# Patient Record
Sex: MALE | Race: WHITE | Employment: FULL TIME | URBAN - METROPOLITAN AREA
[De-identification: names, ages, dates, MRNs, and addresses within clinical notes are randomized per-mention and may not be internally consistent; named-entity substitution may affect disease eponyms.]

---

## 2018-01-06 ENCOUNTER — APPOINTMENT (OUTPATIENT)
Dept: CT IMAGING | Age: 33
DRG: 158 | End: 2018-01-06
Attending: EMERGENCY MEDICINE
Payer: SELF-PAY

## 2018-01-06 ENCOUNTER — HOSPITAL ENCOUNTER (INPATIENT)
Age: 33
LOS: 2 days | Discharge: HOME | DRG: 158 | End: 2018-01-08
Attending: EMERGENCY MEDICINE | Admitting: INTERNAL MEDICINE
Payer: SELF-PAY

## 2018-01-06 DIAGNOSIS — K04.7 DENTAL ABSCESS: ICD-10-CM

## 2018-01-06 DIAGNOSIS — L03.221 CELLULITIS, NECK: Primary | ICD-10-CM

## 2018-01-06 PROBLEM — L03.90 CELLULITIS: Status: ACTIVE | Noted: 2018-01-06

## 2018-01-06 LAB
ALBUMIN SERPL-MCNC: 3.6 G/DL (ref 3.5–5)
ALBUMIN/GLOB SERPL: 0.9 {RATIO} (ref 1.1–2.2)
ALP SERPL-CCNC: 65 U/L (ref 45–117)
ALT SERPL-CCNC: 31 U/L (ref 12–78)
ANION GAP SERPL CALC-SCNC: 8 MMOL/L (ref 5–15)
AST SERPL-CCNC: 11 U/L (ref 15–37)
BASOPHILS # BLD: 0 K/UL (ref 0–0.1)
BASOPHILS NFR BLD: 0 % (ref 0–1)
BILIRUB SERPL-MCNC: 0.9 MG/DL (ref 0.2–1)
BUN SERPL-MCNC: 11 MG/DL (ref 6–20)
BUN/CREAT SERPL: 13 (ref 12–20)
CALCIUM SERPL-MCNC: 8.7 MG/DL (ref 8.5–10.1)
CHLORIDE SERPL-SCNC: 103 MMOL/L (ref 97–108)
CO2 SERPL-SCNC: 29 MMOL/L (ref 21–32)
CREAT SERPL-MCNC: 0.85 MG/DL (ref 0.7–1.3)
EOSINOPHIL # BLD: 0 K/UL (ref 0–0.4)
EOSINOPHIL NFR BLD: 0 % (ref 0–7)
ERYTHROCYTE [DISTWIDTH] IN BLOOD BY AUTOMATED COUNT: 11.9 % (ref 11.5–14.5)
GLOBULIN SER CALC-MCNC: 3.8 G/DL (ref 2–4)
GLUCOSE SERPL-MCNC: 111 MG/DL (ref 65–100)
HCT VFR BLD AUTO: 43.1 % (ref 36.6–50.3)
HGB BLD-MCNC: 14.8 G/DL (ref 12.1–17)
LYMPHOCYTES # BLD: 2.3 K/UL (ref 0.8–3.5)
LYMPHOCYTES NFR BLD: 17 % (ref 12–49)
MCH RBC QN AUTO: 31.1 PG (ref 26–34)
MCHC RBC AUTO-ENTMCNC: 34.3 G/DL (ref 30–36.5)
MCV RBC AUTO: 90.5 FL (ref 80–99)
MONOCYTES # BLD: 1.6 K/UL (ref 0–1)
MONOCYTES NFR BLD: 12 % (ref 5–13)
NEUTS SEG # BLD: 9.6 K/UL (ref 1.8–8)
NEUTS SEG NFR BLD: 71 % (ref 32–75)
PLATELET # BLD AUTO: 184 K/UL (ref 150–400)
POTASSIUM SERPL-SCNC: 3.6 MMOL/L (ref 3.5–5.1)
PROT SERPL-MCNC: 7.4 G/DL (ref 6.4–8.2)
RBC # BLD AUTO: 4.76 M/UL (ref 4.1–5.7)
SODIUM SERPL-SCNC: 140 MMOL/L (ref 136–145)
WBC # BLD AUTO: 13.6 K/UL (ref 4.1–11.1)

## 2018-01-06 PROCEDURE — 70491 CT SOFT TISSUE NECK W/DYE: CPT

## 2018-01-06 PROCEDURE — 80053 COMPREHEN METABOLIC PANEL: CPT | Performed by: EMERGENCY MEDICINE

## 2018-01-06 PROCEDURE — 74011250636 HC RX REV CODE- 250/636: Performed by: EMERGENCY MEDICINE

## 2018-01-06 PROCEDURE — 77030027138 HC INCENT SPIROMETER -A

## 2018-01-06 PROCEDURE — 99284 EMERGENCY DEPT VISIT MOD MDM: CPT

## 2018-01-06 PROCEDURE — 74011000258 HC RX REV CODE- 258: Performed by: INTERNAL MEDICINE

## 2018-01-06 PROCEDURE — 84484 ASSAY OF TROPONIN QUANT: CPT | Performed by: INTERNAL MEDICINE

## 2018-01-06 PROCEDURE — 96375 TX/PRO/DX INJ NEW DRUG ADDON: CPT

## 2018-01-06 PROCEDURE — 96374 THER/PROPH/DIAG INJ IV PUSH: CPT

## 2018-01-06 PROCEDURE — 74011250636 HC RX REV CODE- 250/636: Performed by: INTERNAL MEDICINE

## 2018-01-06 PROCEDURE — 85025 COMPLETE CBC W/AUTO DIFF WBC: CPT | Performed by: EMERGENCY MEDICINE

## 2018-01-06 PROCEDURE — 96361 HYDRATE IV INFUSION ADD-ON: CPT

## 2018-01-06 PROCEDURE — 93005 ELECTROCARDIOGRAM TRACING: CPT

## 2018-01-06 PROCEDURE — 87040 BLOOD CULTURE FOR BACTERIA: CPT | Performed by: EMERGENCY MEDICINE

## 2018-01-06 PROCEDURE — 65660000000 HC RM CCU STEPDOWN

## 2018-01-06 PROCEDURE — 36415 COLL VENOUS BLD VENIPUNCTURE: CPT | Performed by: INTERNAL MEDICINE

## 2018-01-06 PROCEDURE — 74011636320 HC RX REV CODE- 636/320: Performed by: RADIOLOGY

## 2018-01-06 RX ORDER — SODIUM CHLORIDE 9 MG/ML
125 INJECTION, SOLUTION INTRAVENOUS CONTINUOUS
Status: DISPENSED | OUTPATIENT
Start: 2018-01-06 | End: 2018-01-07

## 2018-01-06 RX ORDER — ENOXAPARIN SODIUM 100 MG/ML
40 INJECTION SUBCUTANEOUS EVERY 24 HOURS
Status: DISCONTINUED | OUTPATIENT
Start: 2018-01-06 | End: 2018-01-08 | Stop reason: HOSPADM

## 2018-01-06 RX ORDER — KETOROLAC TROMETHAMINE 30 MG/ML
30 INJECTION, SOLUTION INTRAMUSCULAR; INTRAVENOUS
Status: COMPLETED | OUTPATIENT
Start: 2018-01-06 | End: 2018-01-06

## 2018-01-06 RX ORDER — AMOXICILLIN AND CLAVULANATE POTASSIUM 875; 125 MG/1; MG/1
1 TABLET, FILM COATED ORAL EVERY 12 HOURS
COMMUNITY

## 2018-01-06 RX ORDER — CLINDAMYCIN PHOSPHATE 900 MG/50ML
900 INJECTION, SOLUTION INTRAVENOUS
Status: COMPLETED | OUTPATIENT
Start: 2018-01-06 | End: 2018-01-06

## 2018-01-06 RX ORDER — ACETAMINOPHEN 325 MG/1
650 TABLET ORAL
Status: DISCONTINUED | OUTPATIENT
Start: 2018-01-06 | End: 2018-01-08 | Stop reason: HOSPADM

## 2018-01-06 RX ORDER — ONDANSETRON 2 MG/ML
4 INJECTION INTRAMUSCULAR; INTRAVENOUS
Status: DISCONTINUED | OUTPATIENT
Start: 2018-01-06 | End: 2018-01-08 | Stop reason: HOSPADM

## 2018-01-06 RX ORDER — SODIUM CHLORIDE 0.9 % (FLUSH) 0.9 %
5-10 SYRINGE (ML) INJECTION AS NEEDED
Status: DISCONTINUED | OUTPATIENT
Start: 2018-01-06 | End: 2018-01-08 | Stop reason: HOSPADM

## 2018-01-06 RX ORDER — HYDROMORPHONE HYDROCHLORIDE 2 MG/ML
0.5 INJECTION, SOLUTION INTRAMUSCULAR; INTRAVENOUS; SUBCUTANEOUS
Status: DISCONTINUED | OUTPATIENT
Start: 2018-01-06 | End: 2018-01-08 | Stop reason: HOSPADM

## 2018-01-06 RX ORDER — CLINDAMYCIN PHOSPHATE 900 MG/50ML
900 INJECTION, SOLUTION INTRAVENOUS EVERY 8 HOURS
Status: DISCONTINUED | OUTPATIENT
Start: 2018-01-06 | End: 2018-01-06

## 2018-01-06 RX ORDER — DEXAMETHASONE SODIUM PHOSPHATE 10 MG/ML
10 INJECTION INTRAMUSCULAR; INTRAVENOUS
Status: COMPLETED | OUTPATIENT
Start: 2018-01-06 | End: 2018-01-06

## 2018-01-06 RX ORDER — HYDROCODONE BITARTRATE AND ACETAMINOPHEN 7.5; 325 MG/1; MG/1
1 TABLET ORAL
COMMUNITY

## 2018-01-06 RX ORDER — SODIUM CHLORIDE 0.9 % (FLUSH) 0.9 %
5-10 SYRINGE (ML) INJECTION EVERY 8 HOURS
Status: DISCONTINUED | OUTPATIENT
Start: 2018-01-06 | End: 2018-01-08 | Stop reason: HOSPADM

## 2018-01-06 RX ORDER — DEXAMETHASONE SODIUM PHOSPHATE 4 MG/ML
4 INJECTION, SOLUTION INTRA-ARTICULAR; INTRALESIONAL; INTRAMUSCULAR; INTRAVENOUS; SOFT TISSUE EVERY 6 HOURS
Status: DISCONTINUED | OUTPATIENT
Start: 2018-01-06 | End: 2018-01-06

## 2018-01-06 RX ORDER — HYDROCODONE BITARTRATE AND ACETAMINOPHEN 5; 325 MG/1; MG/1
1 TABLET ORAL
Status: DISCONTINUED | OUTPATIENT
Start: 2018-01-06 | End: 2018-01-08 | Stop reason: HOSPADM

## 2018-01-06 RX ORDER — DEXAMETHASONE SODIUM PHOSPHATE 4 MG/ML
4 INJECTION, SOLUTION INTRA-ARTICULAR; INTRALESIONAL; INTRAMUSCULAR; INTRAVENOUS; SOFT TISSUE EVERY 6 HOURS
Status: DISCONTINUED | OUTPATIENT
Start: 2018-01-06 | End: 2018-01-08

## 2018-01-06 RX ORDER — NALOXONE HYDROCHLORIDE 0.4 MG/ML
0.4 INJECTION, SOLUTION INTRAMUSCULAR; INTRAVENOUS; SUBCUTANEOUS AS NEEDED
Status: DISCONTINUED | OUTPATIENT
Start: 2018-01-06 | End: 2018-01-08 | Stop reason: HOSPADM

## 2018-01-06 RX ADMIN — KETOROLAC TROMETHAMINE 30 MG: 30 INJECTION, SOLUTION INTRAMUSCULAR at 02:23

## 2018-01-06 RX ADMIN — Medication 10 ML: at 06:00

## 2018-01-06 RX ADMIN — DEXAMETHASONE SODIUM PHOSPHATE 4 MG: 4 INJECTION, SOLUTION INTRAMUSCULAR; INTRAVENOUS at 13:29

## 2018-01-06 RX ADMIN — DEXAMETHASONE SODIUM PHOSPHATE 4 MG: 4 INJECTION, SOLUTION INTRAMUSCULAR; INTRAVENOUS at 08:18

## 2018-01-06 RX ADMIN — CLINDAMYCIN PHOSPHATE 900 MG: 900 INJECTION, SOLUTION INTRAVENOUS at 03:17

## 2018-01-06 RX ADMIN — SODIUM CHLORIDE 3.38 G: 900 INJECTION, SOLUTION INTRAVENOUS at 15:58

## 2018-01-06 RX ADMIN — SODIUM CHLORIDE 125 ML/HR: 9 INJECTION, SOLUTION INTRAVENOUS at 08:51

## 2018-01-06 RX ADMIN — DEXAMETHASONE SODIUM PHOSPHATE 10 MG: 10 INJECTION, SOLUTION INTRAMUSCULAR; INTRAVENOUS at 02:21

## 2018-01-06 RX ADMIN — DEXAMETHASONE SODIUM PHOSPHATE 4 MG: 4 INJECTION, SOLUTION INTRAMUSCULAR; INTRAVENOUS at 20:59

## 2018-01-06 RX ADMIN — SODIUM CHLORIDE 3.38 G: 900 INJECTION, SOLUTION INTRAVENOUS at 06:32

## 2018-01-06 RX ADMIN — VANCOMYCIN HYDROCHLORIDE 1750 MG: 10 INJECTION, POWDER, LYOPHILIZED, FOR SOLUTION INTRAVENOUS at 07:26

## 2018-01-06 RX ADMIN — ENOXAPARIN SODIUM 40 MG: 40 INJECTION SUBCUTANEOUS at 04:39

## 2018-01-06 RX ADMIN — VANCOMYCIN HYDROCHLORIDE 1000 MG: 1 INJECTION, POWDER, LYOPHILIZED, FOR SOLUTION INTRAVENOUS at 15:39

## 2018-01-06 RX ADMIN — SODIUM CHLORIDE 3.38 G: 900 INJECTION, SOLUTION INTRAVENOUS at 22:27

## 2018-01-06 RX ADMIN — Medication 10 ML: at 20:59

## 2018-01-06 RX ADMIN — IOPAMIDOL 100 ML: 612 INJECTION, SOLUTION INTRAVENOUS at 02:55

## 2018-01-06 RX ADMIN — SODIUM CHLORIDE 125 ML/HR: 9 INJECTION, SOLUTION INTRAVENOUS at 04:42

## 2018-01-06 RX ADMIN — SODIUM CHLORIDE 1000 ML: 9 INJECTION, SOLUTION INTRAVENOUS at 02:27

## 2018-01-06 RX ADMIN — SODIUM CHLORIDE 125 ML/HR: 9 INJECTION, SOLUTION INTRAVENOUS at 15:47

## 2018-01-06 NOTE — ROUTINE PROCESS
TRANSFER - OUT REPORT:    Verbal report given to Eliz Canchola on [facility] [employer] [facility]  being transferred to [facility] for routine progression of care       Report consisted of patients Situation, Background, Assessment and   Recommendations(SBAR). Information from the following report(s) SBAR, ED Summary, [facility] and Recent Results was reviewed with the receiving nurse. Opportunity for questions and clarification was provided.

## 2018-01-06 NOTE — ED PROVIDER NOTES
HPI Comments: 28 y.o. male with no significant past medical history who presents ambulatory to the ED accompanied by spouse, with chief complaint of throat and jaw pain/swelling. Wife states that patient developed a \"toothache\" at the beginning of the week (5-6 days ago). After the onset of dental pain, he developed both significant pain and swelling around the area of the chin. When the pain became \"unbearable\" he was seen at an ED in [place], [place] on Thursday night, 1/4/2018. Wife states that there was no testing performed at that time, and he did not receive any prescription medications. He followed-up with dentistry yesterday (Friday 1/5/2018) and was diagnosed with \"an abscess and cellulitis\". Was prescribed Norco for pain control, as well as Augmentin for abx. He has been taking the Norco, but it has not changed his level of pain. He has also taken two doses of the abx so far, but his swelling has progressed. Now the pain and swelling have moved from the chin into the jaw and throat, which has been a rapid change compared to 10 hours ago when he saw the dentist. Wife also reports that patient had a fever at home with Tmax of 102.7F, and pt has been complaining of numbness in the face around the area of swelling, as well as difficulty swallowing. Pt still c/o 5/10 pain in the throat at this time. Wife called the dentist on-call and discussed the patient's progression of symptoms, and it was recommended that he come to the ED for further evaluation and treatment. Pt specifically denies any SOB, cough, nausea, or vomiting. There are no other acute medical concerns at this time. PCP: None     Note written by Leonid Bob. Chicho Dong, as dictated by Betzaida Robison MD 1:11 AM     The history is provided by the patient and the spouse. No  was used. No past medical history on file. No past surgical history on file. No family history on file.     Social History     Social History    Marital status:      Spouse name: N/A    Number of children: N/A    Years of education: N/A     Occupational History    Not on file. Social History Main Topics    Smoking status: Not on file    Smokeless tobacco: Not on file    Alcohol use Not on file    Drug use: Not on file    Sexual activity: Not on file     Other Topics Concern    Not on file     Social History Narrative         ALLERGIES: Review of patient's allergies indicates no known allergies. Review of Systems   Constitutional: Positive for fever. Negative for diaphoresis. HENT: Positive for facial swelling (jaw), sore throat and trouble swallowing.         + Throat/Neck swelling   Eyes: Negative for visual disturbance. Respiratory: Negative for cough and shortness of breath. Cardiovascular: Negative for chest pain. Gastrointestinal: Negative for abdominal pain, nausea and vomiting. Genitourinary: Negative for dysuria. Musculoskeletal: Negative for joint swelling. Skin: Negative for rash. Neurological: Negative for headaches. Hematological: Negative for adenopathy. Psychiatric/Behavioral: Negative for suicidal ideas. Vitals:    01/06/18 0041 01/06/18 0232 01/06/18 0234   BP: 99/66 104/68    Pulse: 87     Resp: 20     Temp: 98.5 °F (36.9 °C)     SpO2: 96%  94%   Weight: 70.3 kg (155 lb)     Height: 5' 7\" (1.702 m)              Physical Exam   Constitutional: He is oriented to person, place, and time. He appears well-developed and well-nourished. No distress. HENT:   Head: Normocephalic and atraumatic. Mouth/Throat: Oropharynx is clear and moist.   Eyes: Pupils are equal, round, and reactive to light. Neck: Neck supple. There is marked submandibular and neck swelling; difficulty opening jaw   Cardiovascular: Normal rate, regular rhythm, normal heart sounds and intact distal pulses. Pulmonary/Chest: Effort normal and breath sounds normal. No respiratory distress. Abdominal: Soft.  Bowel sounds are normal. He exhibits no distension. There is no tenderness. Musculoskeletal: Normal range of motion. He exhibits no edema. Neurological: He is alert and oriented to person, place, and time. Skin: Skin is warm and dry. Nursing note and vitals reviewed. Note written by Heather Gustafson. Mercedes Whitlock, as dictated by Argelia Vicente MD 1:11 AM      MDM  Number of Diagnoses or Management Options  Cellulitis, neck:   Dental abscess:   Diagnosis management comments: A:  Healthy 35yo M with significant swelling to neck and submandibular area. Diagnosed with possible dental abscess earlier in day and started on Augmentin.  + fever this afternoon with significant increase in swelling.  + difficult swallowing but no respiratory/airway symptoms. VS stable in ED. Marked swelling on exam.    P:  Labs  Blood cultures  Ct neck    ED Course       Procedures    WBC=13.6    CT Results (most recent):    Results from [place] encounter on 01/06/18   CT NECK SOFT TISSUE W CONT   Narrative HISTORY:   Question abscess    COMPARISON:  None    TECHNIQUE:  Axial neck CT was performed after the uneventful administration of  100 CC Isovue-370. Sagittal and coronal reformats were performed. CT dose  reduction was achieved through use of a standardized protocol tailored for this  examination and automatic exposure control for dose modulation. FINDINGS:    The nasopharynx, oropharynx, hypopharynx and larynx are normal.  There is  limitation in evaluating the oral cavity, although grossly the oral tongue,  buccal spaces and floor of mouth are normal.   The thyroid, submandibular, and  parotid glands are normal.   There are scattered nonenlarged lymph nodes in the  cervical soft tissues. Impression IMPRESSION:  1. Thickening of the skin of the soft tissues of the face. No drainable abscess          Pt started on clinda. VS remain stable with no evidence of airway involvement.   Will discuss admission to hospitalist for IV abx and fluids. CONSULT NOTE:  3:32 AM Cam Sierra MD spoke with Dr. Marcelo Michel, Consult for Hospitalist.  Discussed available diagnostic tests and clinical findings. She is in agreement with care plans as outlined.   Dr. Marcelo Michel will evaluate the patient for admission to the hospital.

## 2018-01-06 NOTE — PROGRESS NOTES
[facility] Dosing Services: Antimicrobial Stewardship Progress Note    Consult for antibiotic dosing of Clindamycin by Dr. Gallegos Patient  Pharmacist reviewed antibiotic appropriateness for 28year old , male  for indication of cellulitus and tooth infection  Day of Therapy     Plan:  Pharmacy to follow daily and will make changes to dose and/or frequency based on clinical status. Non-Kinetic Antimicrobial Dosing:   Current Regimen:  Clindamycin 900 mg IV q8H  Recommendation:   Other Antimicrobial  (not dosed by pharmacist)      Cultures        Serum Creatinine     Lab Results   Component Value Date/Time    Creatinine 0.85 01/06/2018 01:57 AM       Creatinine Clearance Estimated Creatinine Clearance: 116.6 mL/min (based on Cr of 0.85).      Temp   98.5 °F (36.9 °C)    WBC   Lab Results   Component Value Date/Time    WBC 13.6 01/06/2018 01:57 AM       H/H   Lab Results   Component Value Date/Time    HGB 14.8 01/06/2018 01:57 AM        Platelets   Lab Results   Component Value Date/Time    PLATELET 293 63/36/5689 01:57 AM          Pharmacist: Signed Edgar Olsen Contact information: [phone]

## 2018-01-06 NOTE — PROGRESS NOTES
[facility]: MED RECONCILIATION    Comments/Recommendations:    Medication reconciliation completed by wife as patient was sleeping.  Patient does not take any medications regularly but is currently taking Augmentin for tooth infection (started yesterday and took 2 doses) and Norco for acute pain, last dose yesterday ~6pm.   Confirmed NKDA and updated pharmacy. Medications added:     · Augmentin  · Norco    Medications removed:    · none    Medications adjusted:    · none    Information obtained from: wife    Allergies: Review of patient's allergies indicates no known allergies. Prior to Admission Medications:     Medication Documentation Review Audit       Reviewed by Mike Corral (Pharmacist) on 01/06/18 at 0920         Medication Sig Documenting Provider Last Dose Status Taking?      amoxicillin-clavulanate (AUGMENTIN) 875-125 mg per tablet Take 1 Tab by mouth every twelve (12) hours. Historical Provider 1/5/2018 PM Active Yes             Med Note (MIKE CORRAL   Sat Jan 6, 2018  9:19 AM): Started 1/5/17      HYDROcodone-acetaminophen (NORCO) 7.5-325 mg per tablet Take 1 Tab by mouth every six (6) hours as needed for Pain.  Historical Provider 1/5/2018 PM Active Yes                    Thank Oj Larkin, PharmD   Contact: [phone]

## 2018-01-06 NOTE — CONSULTS
Otolaryngology (ENT) Consult    Subjective:     Date of Consultation:  January 6, 2018    Referring Physician:    Ivet Kim             History of Present Illness:   Patient is a 28 y.o. male who had some tooth pain and neck swelling prior to arrival at his dental office. Told to come to the ER due to cellulitis suspicion. Pt came to [facility] ER and had a neck CT performed that shows diffuse neck edema but no obstruction of the vocal cords or airway. ATSP regarding diagnosis of howie's angina. Patient Active Problem List    Diagnosis Date Noted    Cellulitis 01/06/2018     No past medical history on file. No family history on file. Social History   Substance Use Topics    Smoking status: Not on file    Smokeless tobacco: Not on file    Alcohol use Not on file     No past surgical history on file.    Current Facility-Administered Medications   Medication Dose Route Frequency    0.9% sodium chloride infusion  125 mL/hr IntraVENous CONTINUOUS    sodium chloride (NS) flush 5-10 mL  5-10 mL IntraVENous Q8H    sodium chloride (NS) flush 5-10 mL  5-10 mL IntraVENous PRN    acetaminophen (TYLENOL) tablet 650 mg  650 mg Oral Q4H PRN    HYDROcodone-acetaminophen (NORCO) 5-325 mg per tablet 1 Tab  1 Tab Oral Q4H PRN    HYDROmorphone (DILAUDID) injection 0.5 mg  0.5 mg IntraVENous Q4H PRN    naloxone (NARCAN) injection 0.4 mg  0.4 mg IntraVENous PRN    ondansetron (ZOFRAN) injection 4 mg  4 mg IntraVENous Q4H PRN    enoxaparin (LOVENOX) injection 40 mg  40 mg SubCUTAneous Q24H    piperacillin-tazobactam (ZOSYN) 3.375 g in 0.9% sodium chloride 50 mL IVPB  3.375 g IntraVENous Q8H    vancomycin (VANCOCIN) 1,000 mg in 0.9% sodium chloride (MBP/ADV) 250 mL  1 g IntraVENous Q8H    dexamethasone (DECADRON) 4 mg/mL injection 4 mg  4 mg IntraVENous Q6H     Current Outpatient Prescriptions   Medication Sig    amoxicillin-clavulanate (AUGMENTIN) 875-125 mg per tablet Take 1 Tab by mouth every twelve (12) hours.    HYDROcodone-acetaminophen (NORCO) 7.5-325 mg per tablet Take 1 Tab by mouth every six (6) hours as needed for Pain. No Known Allergies     Review of Systems:  A comprehensive review of systems was negative except for that written in the History of Present Illness. Objective:     Patient Vitals for the past 8 hrs:   BP Temp Pulse Resp SpO2 Height Weight   18 0900 96/65 - 80 - 96 % - -   18 0830 - - 74 - 95 % - -   18 0800 94/59 - 73 - 96 % - -   18 0730 - - 82 - 96 % - -   18 0451 95/64 98.7 °F (37.1 °C) 86 18 95 % 5' 7\" (1.702 m) 70.3 kg (155 lb)   18 0330 104/67 - - - 95 % - -   18 0315 100/65 - - - 94 % - -   18 0300 113/70 - - - 96 % - -   18 0234 - - - - 94 % - -   18 0232 104/68 - - - - - -     Temp (24hrs), Av.6 °F (37 °C), Min:98.5 °F (36.9 °C), Max:98.7 °F (37.1 °C)         Physical Exam:   Nose clear   Neck diffuse edema no discrete mass  OC clear      NeckCT : clear airway, neck with diffuse edema no discrete mass    Assessment:     Neck Edema/cellulitis    Plan:     1. Maintain IV abx for at least 48 hours  2.  Decadron   3  Will need to have tooth pulled which should resolve the issue from returning       Signed By: Renetta Mcarthur MD     2018

## 2018-01-06 NOTE — H&P
[address] [address]  [phone]    Admission History and Physical      NAME:  Leena Byrnes   :   1985   MRN:  [id number]     PCP:  None     Date/Time:  2018         Subjective:     CHIEF COMPLAINT: \"I have swelling\"     HISTORY OF PRESENT ILLNESS:     Mr. [facility] is a 28 y.o.  male with no PMH admitted for facial swelling. Per pt, noted to have a tooth abscess by his dentist after presenting with dental pain. Was started on Augmentin with improvement in dental pain but then started noted swelling in his jaw bilaterally. Having difficulty swallowing. (+) fevers/chills. No sick contacts. His wife is a home health nurse     PMH   None     No past surgical history on file.     SH   No tobacco or drug use      FH  HTN     No Known Allergies     PTA Meds  Augmentin     Review of Systems:  (bold if positive, if negative)    Gen:  Eyes:  ENT:  CVS:  Pulm:  GI:    :    MS:  Skin:  Psych:  Endo:    Hem:  Renal:    Neuro:     Fevers/chills   Swelling        Objective:      VITALS:    Vital signs reviewed; most recent are:    Visit Vitals    BP 95/64 (BP 1 Location: Right arm, BP Patient Position: At rest;Supine)    Pulse 86    Temp 98.7 °F (37.1 °C)    Resp 18    Ht 5' 7\" (1.702 m)    Wt 70.3 kg (155 lb)    SpO2 95%    BMI 24.28 kg/m2     SpO2 Readings from Last 6 Encounters:   18 95%        No intake or output data in the 24 hours ending 18 0618         Exam:     Physical Exam:    Gen:  Well-developed, well-nourished, in no acute distress  HEENT:  Pink conjunctivae, PERRL, hearing intact to voice, moist mucous membranes  Neck:  Supple, without masses, thyroid non-tender  Resp:  No accessory muscle use, clear breath sounds without wheezes rales or rhonchi  Card:  No murmurs, normal S1, S2 without thrills, bruits or peripheral edema  Abd:  Soft, non-tender, non-distended, normoactive bowel sounds are present, no palpable organomegaly  Lymph:  No cervical adenopathy  Musc:  No cyanosis or clubbing  Skin:  No rashes or ulcers, skin turgor is good  Neuro:  Cranial nerves 3-12 are grossly intact,  strength is 5/5 bilaterally, dorsi / plantarflexion strength is 5/5 bilaterally, follows commands appropriately  Psych:  Alert with good insight. Oriented to person, place, and time  Diffuse soft tissue swelling of the neck region bilaterally. Non-tender. Non-erythematous. No obvious purulent drainage. Small area of erythema on the R portion of the face near follicle of his beard      Labs:    Recent Labs      01/06/18   0157   WBC  13.6*   HGB  14.8   HCT  43.1   PLT  184     Recent Labs      01/06/18   0157   NA  140   K  3.6   CL  103   CO2  29   GLU  111*   BUN  11   CREA  0.85   CA  8.7   ALB  3.6   SGOT  11*   ALT  31     No components found for: GLPOC  No results for input(s): PH, PCO2, PO2, HCO3, FIO2 in the last 72 hours. No results for input(s): INR in the last 72 hours. No lab exists for component: INREXT    CT neck =>   The nasopharynx, oropharynx, hypopharynx and larynx are normal.  There is  limitation in evaluating the oral cavity, although grossly the oral tongue,  buccal spaces and floor of mouth are normal.   The thyroid, submandibular, and  parotid glands are normal.   There are scattered nonenlarged lymph nodes in the  cervical soft tissues.        Assessment/Plan:    Soft tissue infection/Wiley's angina   -start zosyn and vanco (pt's wife is a healthcare provider so concern for MRSA)   -start IV steroids due to degree of soft tissue swelling   -CT without evidence of drainable abscess   -will ask ENT to eval   -f/u cultures; no purulent drainage     Surrogate decision maker: Wife    Total time spent with patient: 48 895 [facility] discussed with: Patient and Family    Discussed:  Code Status, Care Plan and D/C Planning    Prophylaxis:  Lovenox    Probable Disposition:  Home w/Family           ___________________________________________________    Attending Physician: Heather Bsuby MD

## 2018-01-06 NOTE — PROGRESS NOTES
[facility] Dosing Services: Antimicrobial Stewardship Progress Note    Consult for antibiotic dosing of Vancomycin by Dr. Starr Miranda  Pharmacist reviewed antibiotic appropriateness for 28year old , male  for indication of abscess of face and tooth  Day of Therapy     Plan:  Vancomycin therapy:  Start Vancomycin therapy, with loading dose of 1750 (mg) at 0700 1/6/18. Follow with maintenance dose of 1000(mg) at 1600 1/6/18 8 hours (frequency). Dose calculated to approximate a therapeutic trough @ 15 mcg/mL. Last trough level / Plan for level:   Pharmacy to follow daily and will make changes to dose and/or frequency based on clinical status. Non-Kinetic Antimicrobial Dosing:   Current Regimen:    Recommendation:   Other Antimicrobial  (not dosed by pharmacist)   Zosyn   Cultures        Serum Creatinine     Lab Results   Component Value Date/Time    Creatinine 0.85 01/06/2018 01:57 AM       Creatinine Clearance Estimated Creatinine Clearance: 116.6 mL/min (based on Cr of 0.85).      Temp   98.7 °F (37.1 °C)    WBC   Lab Results   Component Value Date/Time    WBC 13.6 01/06/2018 01:57 AM       H/H   Lab Results   Component Value Date/Time    HGB 14.8 01/06/2018 01:57 AM        Platelets   Lab Results   Component Value Date/Time    PLATELET 954 72/56/1753 01:57 AM          Pharmacist: Renetta Cr Contact [phone]: [phone]

## 2018-01-06 NOTE — ED TRIAGE NOTES
Patient arrives with c/o throat abscess onset Monday. Patient has a large pocket of infection on a bottom middle tooth and the infection has now traveled down his throat and patient is having difficulty swallowing and speaking in triage.

## 2018-01-06 NOTE — IP AVS SNAPSHOT
303 [facility], [place] 104 70 [facility] 
[phone] Patient: Ja Gay MRN: [id number] :1985 About your hospitalization You were admitted on:  2018 You last received care in the:  [facility] 4M POST SURG ORT 1 You were discharged on:  2018 Why you were hospitalized Your primary diagnosis was:  Cellulitis Your diagnoses also included:  Leukocytosis, Sirs (Systemic Inflammatory Response Syndrome) (Hcc) Follow-up Information Follow up With Details Comments Contact Info Ne Onofre MD In 5 days  8701 [facility] [address] [place] [address] 
[phone] Aracelis Gonzales MD  As needed [facility] and [address] [address] [phone] Follow up with your dentist     
 None   None (395) Patient stated that they have no PCP Discharge Orders None A check bodulia indicates which time of day the medication should be taken. My Medications START taking these medications Instructions Each Dose to Equal  
 Morning Noon Evening Bedtime  
 azithromycin 250 mg tablet Commonly known as:  Vallorie Rogue Your last dose was: Your next dose is: Take 500mg daily on day 1, followed by 250mg daily on days 2-5 #6 tabs  
     
   
   
   
  
 lactobacillus (B.ani-L.aci-L.sal-L.plan-L. George) 10 billion cell (2 billion ea) Cap capsule Commonly known as:  PROBIOTIC FORMULA Your last dose was: Your next dose is: Take 1 Cap by mouth daily. 1 Cap CONTINUE taking these medications Instructions Each Dose to Equal  
 Morning Noon Evening Bedtime AUGMENTIN 875-125 mg per tablet Generic drug:  amoxicillin-clavulanate Your last dose was: Your next dose is: Take 1 Tab by mouth every twelve (12) hours. 1 Tab NORCO 7.5-325 mg per tablet Generic drug:  HYDROcodone-acetaminophen Your last dose was: Your next dose is: Take 1 Tab by mouth every six (6) hours as needed for Pain. 1 Tab Where to Get Your Medications Information on where to get these meds will be given to you by the nurse or doctor. ! Ask your nurse or doctor about these medications  
  azithromycin 250 mg tablet  
 lactobacillus (B.ani-L.aci-L.sal-L.plan-L. George) 10 billion cell (2 billion ea) Cap capsule Discharge Instructions HOSPITALIST DISCHARGE INSTRUCTIONS 
NAME: Jing Castellanos :  1985 MRN:  [id number] Date/Time:  2018 11:51 AM 
 
ADMIT DATE: 2018 DISCHARGE DATE: 2018 PRINCIPAL DISCHARGE DIAGNOSES: 
Facial cellulitis MEDICATIONS: 
· It is important that you take the medication exactly as they are prescribed. Note the changes and additions to your medications. Be sure you understand these changes before you are discharged today. · Keep your medication in the bottles provided by the pharmacist and keep a list of the medication names, dosages, and times to be taken in your wallet. · Do not take other medications without consulting your doctor. Pain Management: per above medications What to do at [facility] Recommended diet:  Resume previous diet Recommended activity: Activity as tolerated If you experience any of the following symptoms then please call your primary care physician or return to the emergency room if you cannot get hold of your doctor: 
Fever, chills, severe abdominal pain, nausea, vomiting, diarrhea, worsening facial redness, swelling, pain or other severe concerning symptoms that brought you to the hospital in the first place Follow Up: Follow-up Information Follow up With Details Comments Contact Info Jennifer Wright MD In 5 days  [address] 
[phone] Jordan Moreno MD  As needed [facility] and [address] [email or web address] [address] [phone] Follow up with your dentist     
  
 
 
 
Information obtained by : 
I understand that if any problems occur once I am at home I am to contact my physician. I understand and acknowledge receipt of the instructions indicated above. Physician's or R.N.'s Signature                                                                  Date/Time Patient or Representative Signature                                                          Date/Time Skypaz Announcement We are excited to announce that we are making your provider's discharge notes available to you in Skypaz. You will see these notes when they are completed and signed by the physician that discharged you from your recent hospital stay. If you have any questions or concerns about any information you see in Skypaz, please call the Health Information Department where you were seen or reach out to your Primary Care Provider for more information about your plan of care. Introducing \[facility] [place] [facility]\"" & HEALTH SERVICES! [employer] introduces Skypaz patient portal. Now you can access parts of your medical record, email your doctor's office, and request medication refills online. 1. In your internet browser, go to [email or web address] 2. Click on the First Time User? Click Here link in the Sign In box. You will see the New Member Sign Up page. 3. Enter your Skypaz Access Code exactly as it appears below. You will not need to use this code after youve completed the sign-up process.  If you do not sign up before the expiration date, you must request a new code. · [employer] Access Code: [email or web address] Expires: 4/8/2018 11:44 AM 
 
4. Enter the last four digits of your Social Security Number (xxxx) and Date of Birth (mm/dd/yyyy) as indicated and click Submit. You will be taken to the next sign-up page. 5. Create a [employer] ID. This will be your [employer] login ID and cannot be changed, so think of one that is secure and easy to remember. 6. Create a [employer] password. You can change your password at any time. 7. Enter your Password Reset Question and Answer. This can be used at a later time if you forget your password. 8. Enter your e-mail address. You will receive e-mail notification when new information is available in [address] 9. Click Sign Up. You can now view and download portions of your medical record. 10. Click the Download Summary menu link to download a portable copy of your medical information. If you have questions, please visit the Frequently Asked Questions section of the [employer] website. Remember, [employer] is NOT to be used for urgent needs. For medical emergencies, dial 911. Now available from your iPhone and Android! Unresulted Labs-Please follow up with your PCP about these lab tests Order Current Status CULTURE, BLOOD, PAIRED Preliminary result Providers Seen During Your Hospitalization Provider Specialty Primary office phone Tab Pino MD Emergency Medicine [phone] Hannah Mccoy MD Internal Medicine [phone] Sherman Vidal MD Internal Medicine [phone] Your Primary Care Physician (PCP) Primary Care Physician Office Phone Office Fax NONE ** None ** ** None ** You are allergic to the following No active allergies Recent Documentation Height Weight BMI  
  
  
 1.702 m 70.3 kg 24.28 kg/m2 Emergency Contacts Name Discharge Info Relation Home Work Mobile Margaret Madison DISCHARGE CAREGIVER [3] Spouse [3]   [phone] Patient Belongings The following personal items are in your possession at time of discharge: 
  Dental Appliances: None  Visual Aid: Glasses, With patient      Home Medications: None   Jewelry: None  Clothing: At bedside    Other Valuables: Cell Phone Please provide this summary of care documentation to your next provider. Signatures-by signing, you are acknowledging that this After Visit Summary has been reviewed with you and you have received a copy. Patient Signature:  ____________________________________________________________ Date:  ____________________________________________________________  
  
[facility] Current Provider Signature:  ____________________________________________________________ Date:  ____________________________________________________________

## 2018-01-06 NOTE — ED NOTES
Bedside and Verbal shift change report given to Saranya Montejo (oncoming nurse) by James Baugh (offgoing nurse). Report included the following information SBAR, ED Summary, MAR and Recent Results.

## 2018-01-06 NOTE — PROGRESS NOTES
[address] [address]  [phone]    Daily Progress Note      Admission plan reviewed with Dr. Marcelo Michel. Patient seen and examined. Madi Germain feels a little better with decreased swelling. He was seen by Dr. Lisa Benitez earlier today.   The following changes to [facility] plan of care were made: discussed findings and plan with patient and family    Face to face time spent: 10 minutes    Beny Cooper MD  1/6/2018  11:32 AM

## 2018-01-07 PROBLEM — R65.10 SIRS (SYSTEMIC INFLAMMATORY RESPONSE SYNDROME) (HCC): Status: ACTIVE | Noted: 2018-01-07

## 2018-01-07 PROBLEM — D72.829 LEUKOCYTOSIS: Status: ACTIVE | Noted: 2018-01-07

## 2018-01-07 LAB
ANION GAP SERPL CALC-SCNC: 9 MMOL/L (ref 5–15)
BACTERIA SPEC CULT: NORMAL
BACTERIA SPEC CULT: NORMAL
BASOPHILS # BLD: 0 K/UL (ref 0–0.1)
BASOPHILS NFR BLD: 0 % (ref 0–1)
BUN SERPL-MCNC: 16 MG/DL (ref 6–20)
BUN/CREAT SERPL: 19 (ref 12–20)
CALCIUM SERPL-MCNC: 8.8 MG/DL (ref 8.5–10.1)
CHLORIDE SERPL-SCNC: 107 MMOL/L (ref 97–108)
CO2 SERPL-SCNC: 25 MMOL/L (ref 21–32)
CREAT SERPL-MCNC: 0.84 MG/DL (ref 0.7–1.3)
DATE LAST DOSE: NORMAL
EOSINOPHIL # BLD: 0 K/UL (ref 0–0.4)
EOSINOPHIL NFR BLD: 0 % (ref 0–7)
ERYTHROCYTE [DISTWIDTH] IN BLOOD BY AUTOMATED COUNT: 11.8 % (ref 11.5–14.5)
GLUCOSE SERPL-MCNC: 193 MG/DL (ref 65–100)
HCT VFR BLD AUTO: 37.4 % (ref 36.6–50.3)
HGB BLD-MCNC: 13.2 G/DL (ref 12.1–17)
LYMPHOCYTES # BLD: 1.1 K/UL (ref 0.8–3.5)
LYMPHOCYTES NFR BLD: 6 % (ref 12–49)
MAGNESIUM SERPL-MCNC: 2 MG/DL (ref 1.6–2.4)
MCH RBC QN AUTO: 30.8 PG (ref 26–34)
MCHC RBC AUTO-ENTMCNC: 35.3 G/DL (ref 30–36.5)
MCV RBC AUTO: 87.4 FL (ref 80–99)
MONOCYTES # BLD: 1 K/UL (ref 0–1)
MONOCYTES NFR BLD: 5 % (ref 5–13)
NEUTS SEG # BLD: 16.7 K/UL (ref 1.8–8)
NEUTS SEG NFR BLD: 89 % (ref 32–75)
PLATELET # BLD AUTO: 194 K/UL (ref 150–400)
POTASSIUM SERPL-SCNC: 3.6 MMOL/L (ref 3.5–5.1)
RBC # BLD AUTO: 4.28 M/UL (ref 4.1–5.7)
REPORTED DOSE,DOSE: NORMAL UNITS
REPORTED DOSE/TIME,TMG: 800
SERVICE CMNT-IMP: NORMAL
SODIUM SERPL-SCNC: 141 MMOL/L (ref 136–145)
TROPONIN I SERPL-MCNC: <0.04 NG/ML
VANCOMYCIN TROUGH SERPL-MCNC: 9.1 UG/ML (ref 5–10)
WBC # BLD AUTO: 18.8 K/UL (ref 4.1–11.1)

## 2018-01-07 PROCEDURE — 65270000029 HC RM PRIVATE

## 2018-01-07 PROCEDURE — 74011000258 HC RX REV CODE- 258: Performed by: INTERNAL MEDICINE

## 2018-01-07 PROCEDURE — 80048 BASIC METABOLIC PNL TOTAL CA: CPT | Performed by: INTERNAL MEDICINE

## 2018-01-07 PROCEDURE — 80202 ASSAY OF VANCOMYCIN: CPT | Performed by: INTERNAL MEDICINE

## 2018-01-07 PROCEDURE — 83735 ASSAY OF MAGNESIUM: CPT | Performed by: INTERNAL MEDICINE

## 2018-01-07 PROCEDURE — 84484 ASSAY OF TROPONIN QUANT: CPT | Performed by: INTERNAL MEDICINE

## 2018-01-07 PROCEDURE — 36415 COLL VENOUS BLD VENIPUNCTURE: CPT | Performed by: INTERNAL MEDICINE

## 2018-01-07 PROCEDURE — 85025 COMPLETE CBC W/AUTO DIFF WBC: CPT | Performed by: INTERNAL MEDICINE

## 2018-01-07 PROCEDURE — 74011250636 HC RX REV CODE- 250/636: Performed by: INTERNAL MEDICINE

## 2018-01-07 RX ADMIN — Medication 10 ML: at 19:51

## 2018-01-07 RX ADMIN — DEXAMETHASONE SODIUM PHOSPHATE 4 MG: 4 INJECTION, SOLUTION INTRAMUSCULAR; INTRAVENOUS at 02:06

## 2018-01-07 RX ADMIN — DEXAMETHASONE SODIUM PHOSPHATE 4 MG: 4 INJECTION, SOLUTION INTRAMUSCULAR; INTRAVENOUS at 19:51

## 2018-01-07 RX ADMIN — SODIUM CHLORIDE 3.38 G: 900 INJECTION, SOLUTION INTRAVENOUS at 06:29

## 2018-01-07 RX ADMIN — VANCOMYCIN HYDROCHLORIDE 1000 MG: 1 INJECTION, POWDER, LYOPHILIZED, FOR SOLUTION INTRAVENOUS at 00:23

## 2018-01-07 RX ADMIN — VANCOMYCIN HYDROCHLORIDE 1000 MG: 1 INJECTION, POWDER, LYOPHILIZED, FOR SOLUTION INTRAVENOUS at 16:42

## 2018-01-07 RX ADMIN — DEXAMETHASONE SODIUM PHOSPHATE 4 MG: 4 INJECTION, SOLUTION INTRAMUSCULAR; INTRAVENOUS at 08:25

## 2018-01-07 RX ADMIN — Medication 10 ML: at 14:55

## 2018-01-07 RX ADMIN — SODIUM CHLORIDE 3.38 G: 900 INJECTION, SOLUTION INTRAVENOUS at 23:05

## 2018-01-07 RX ADMIN — SODIUM CHLORIDE 3.38 G: 900 INJECTION, SOLUTION INTRAVENOUS at 16:42

## 2018-01-07 RX ADMIN — VANCOMYCIN HYDROCHLORIDE 1000 MG: 1 INJECTION, POWDER, LYOPHILIZED, FOR SOLUTION INTRAVENOUS at 08:25

## 2018-01-07 RX ADMIN — DEXAMETHASONE SODIUM PHOSPHATE 4 MG: 4 INJECTION, SOLUTION INTRAMUSCULAR; INTRAVENOUS at 14:53

## 2018-01-07 NOTE — PROGRESS NOTES
[facility] 79  566 [facility], [address] [place] [address]  [phone]      Medical Progress Note      NAME: Ignacio Bowman   :  1985  MRM:  [id number]    Date/Time: 2018  8:30 AM         Subjective:     Chief Complaint:  Pain: face, mild, constant, associated with swelling, slowly improving    ROS:  (bold if positive, if negative)                        Tolerating PT  Tolerating Diet          Objective:       Vitals:          Last 24hrs VS reviewed since prior progress note.  Most recent are:    Visit Vitals    BP 98/54 (BP 1 Location: Left arm, BP Patient Position: At rest;Head of bed elevated (Comment degrees))    Pulse 71    Temp 98.2 °F (36.8 °C)    Resp 16    Ht 5' 7\" (1.702 m)    Wt 70.3 kg (155 lb)    SpO2 97%    BMI 24.28 kg/m2     SpO2 Readings from Last 6 Encounters:   18 97%        No intake or output data in the 24 hours ending 18 7907       Exam:     Physical Exam:    Gen:  Well-developed, well-nourished, in no acute distress  HEENT:  Pink conjunctivae, PERRL, hearing intact to voice, moist mucous membranes  Neck:  Supple, without masses, thyroid non-tender, decreased submandibular edema with shoddy LAD  Resp:  No accessory muscle use, clear breath sounds without wheezes rales or rhonchi  Card:  No murmurs, normal S1, S2 without thrills, bruits or peripheral edema  Abd:  Soft, non-tender, non-distended, normoactive bowel sounds are present, no palpable organomegaly and no detectable hernias  Lymph:  No inguinal adenopathy  Musc:  No cyanosis or clubbing  Skin:  No rashes or ulcers, skin turgor is good  Neuro:  Cranial nerves are grossly intact, no focal motor weakness, follows commands appropriately  Psych:  Good insight, oriented to person, place and time, alert    Medications Reviewed: (see below)    Lab Data Reviewed: (see below)    ______________________________________________________________________    Medications:     Current Facility-Administered Medications   Medication Dose Route Frequency    sodium chloride (NS) flush 5-10 mL  5-10 mL IntraVENous Q8H    sodium chloride (NS) flush 5-10 mL  5-10 mL IntraVENous PRN    acetaminophen (TYLENOL) tablet 650 mg  650 mg Oral Q4H PRN    HYDROcodone-acetaminophen (NORCO) 5-325 mg per tablet 1 Tab  1 Tab Oral Q4H PRN    HYDROmorphone (DILAUDID) injection 0.5 mg  0.5 mg IntraVENous Q4H PRN    naloxone (NARCAN) injection 0.4 mg  0.4 mg IntraVENous PRN    ondansetron (ZOFRAN) injection 4 mg  4 mg IntraVENous Q4H PRN    enoxaparin (LOVENOX) injection 40 mg  40 mg SubCUTAneous Q24H    piperacillin-tazobactam (ZOSYN) 3.375 g in 0.9% sodium chloride 50 mL IVPB  3.375 g IntraVENous Q8H    vancomycin (VANCOCIN) 1,000 mg in 0.9% sodium chloride (MBP/ADV) 250 mL  1 g IntraVENous Q8H    dexamethasone (DECADRON) 4 mg/mL injection 4 mg  4 mg IntraVENous Q6H            Lab Review:     Recent Labs      01/07/18   0022  01/06/18   0157   WBC  18.8*  13.6*   HGB  13.2  14.8   HCT  37.4  43.1   PLT  194  184     Recent Labs      01/07/18   0022  01/06/18   0157   NA  141  140   K  3.6  3.6   CL  107  103   CO2  25  29   GLU  193*  111*   BUN  16  11   CREA  0.84  0.85   CA  8.8  8.7   MG  2.0   --    ALB   --   3.6   TBILI   --   0.9   SGOT   --   11*   ALT   --   31     No results found for: GLUCPOC  No results for input(s): PH, PCO2, PO2, HCO3, FIO2 in the last 72 hours. No results for input(s): INR in the last 72 hours.     No lab exists for component: INREXT, INREXT  No results found for: SDES  Lab Results   Component Value Date/Time    Culture result: MRSA NOT PRESENT AT 14 HOURS 01/06/2018 06:35 AM    Culture result: NO GROWTH 1 DAY 01/06/2018 01:57 AM            Assessment:     Principal Problem:    Cellulitis (1/6/2018)    Active Problems:    Leukocytosis (1/7/2018)      SIRS (systemic inflammatory response syndrome) ([facility].) (1/7/2018)           Plan:     Principal Problem:    Cellulitis (2018)    - due to dental infection   - continue IV antibiotics as above, cultures negative so far   - ENT input appreciated   - continue IV steroids    Active Problems:    Leukocytosis (2018)/SIRS (systemic inflammatory response syndrome) (HCC) (2018)   - SIRS POA, due to acute infection as above, NOT septic   - WBC up now due to steroids   - follow   - Temp (24hrs), Av.5 °F (36.9 °C), Min:98.1 °F (36.7 °C), Max:99.7 °F (37.6 °C)       Total time spent with patient: 25 minutes                  Care Plan discussed with: Patient, Family and Nursing Staff    Discussed:  Code Status, Care Plan and D/C Planning    Prophylaxis:  Lovenox    Disposition:  Home w/Family           ___________________________________________________    Attending Physician: Violette Singh MD

## 2018-01-07 NOTE — PROGRESS NOTES
Bedside and Verbal shift change report given to Lisbet Estevez RN (oncoming nurse) by Eliud Brady RN (offgoing nurse). Report included the following information SBAR, Kardex, ED Summary, Intake/Output, MAR, Accordion and Recent Results.

## 2018-01-07 NOTE — PROGRESS NOTES
Troponin from 1/6 at 2020 still not resulted. Spoke with lab, error was made with lab. Will add on to morning labs now.

## 2018-01-07 NOTE — PROGRESS NOTES
Patient complaining of chest pain and tightness that radiates to left shoulder. EKG and vitals done. MD notified. New orders for Troponin lab draw. Will continue to monitor patient.

## 2018-01-07 NOTE — PROGRESS NOTES
Bedside shift change report given to Lorin (oncoming nurse) by Akua Bass (offgoing nurse). Report included the following information SBAR, Kardex, Procedure Summary, MAR and Recent Results           Primary Nurse Diane Steve RN and Pankaj Garcia RN performed a dual skin assessment on this patient No impairment noted  Edward score is 23.

## 2018-01-08 VITALS
DIASTOLIC BLOOD PRESSURE: 64 MMHG | TEMPERATURE: 98.3 F | WEIGHT: 155 LBS | BODY MASS INDEX: 24.33 KG/M2 | HEIGHT: 67 IN | OXYGEN SATURATION: 96 % | SYSTOLIC BLOOD PRESSURE: 118 MMHG | HEART RATE: 77 BPM | RESPIRATION RATE: 16 BRPM

## 2018-01-08 LAB
ANION GAP SERPL CALC-SCNC: 10 MMOL/L (ref 5–15)
ATRIAL RATE: 97 BPM
BUN SERPL-MCNC: 17 MG/DL (ref 6–20)
BUN/CREAT SERPL: 23 (ref 12–20)
CALCIUM SERPL-MCNC: 8.8 MG/DL (ref 8.5–10.1)
CALCULATED P AXIS, ECG09: 43 DEGREES
CALCULATED R AXIS, ECG10: 51 DEGREES
CALCULATED T AXIS, ECG11: 12 DEGREES
CHLORIDE SERPL-SCNC: 109 MMOL/L (ref 97–108)
CO2 SERPL-SCNC: 25 MMOL/L (ref 21–32)
CREAT SERPL-MCNC: 0.74 MG/DL (ref 0.7–1.3)
DIAGNOSIS, 93000: NORMAL
ERYTHROCYTE [DISTWIDTH] IN BLOOD BY AUTOMATED COUNT: 11.9 % (ref 11.5–14.5)
GLUCOSE SERPL-MCNC: 176 MG/DL (ref 65–100)
HCT VFR BLD AUTO: 35.5 % (ref 36.6–50.3)
HGB BLD-MCNC: 12.4 G/DL (ref 12.1–17)
MAGNESIUM SERPL-MCNC: 2 MG/DL (ref 1.6–2.4)
MCH RBC QN AUTO: 30.6 PG (ref 26–34)
MCHC RBC AUTO-ENTMCNC: 34.9 G/DL (ref 30–36.5)
MCV RBC AUTO: 87.7 FL (ref 80–99)
P-R INTERVAL, ECG05: 120 MS
PHOSPHATE SERPL-MCNC: 1.6 MG/DL (ref 2.6–4.7)
PLATELET # BLD AUTO: 216 K/UL (ref 150–400)
POTASSIUM SERPL-SCNC: 3.7 MMOL/L (ref 3.5–5.1)
Q-T INTERVAL, ECG07: 316 MS
QRS DURATION, ECG06: 88 MS
QTC CALCULATION (BEZET), ECG08: 401 MS
RBC # BLD AUTO: 4.05 M/UL (ref 4.1–5.7)
SODIUM SERPL-SCNC: 144 MMOL/L (ref 136–145)
VENTRICULAR RATE, ECG03: 97 BPM
WBC # BLD AUTO: 15.3 K/UL (ref 4.1–11.1)

## 2018-01-08 PROCEDURE — 80048 BASIC METABOLIC PNL TOTAL CA: CPT | Performed by: INTERNAL MEDICINE

## 2018-01-08 PROCEDURE — 74011000258 HC RX REV CODE- 258: Performed by: INTERNAL MEDICINE

## 2018-01-08 PROCEDURE — 74011250637 HC RX REV CODE- 250/637: Performed by: INTERNAL MEDICINE

## 2018-01-08 PROCEDURE — 83735 ASSAY OF MAGNESIUM: CPT | Performed by: INTERNAL MEDICINE

## 2018-01-08 PROCEDURE — 84100 ASSAY OF PHOSPHORUS: CPT | Performed by: INTERNAL MEDICINE

## 2018-01-08 PROCEDURE — 85027 COMPLETE CBC AUTOMATED: CPT | Performed by: INTERNAL MEDICINE

## 2018-01-08 PROCEDURE — 36415 COLL VENOUS BLD VENIPUNCTURE: CPT | Performed by: INTERNAL MEDICINE

## 2018-01-08 PROCEDURE — 74011250636 HC RX REV CODE- 250/636: Performed by: INTERNAL MEDICINE

## 2018-01-08 RX ORDER — SODIUM,POTASSIUM PHOSPHATES 280-250MG
2 POWDER IN PACKET (EA) ORAL EVERY 4 HOURS
Status: COMPLETED | OUTPATIENT
Start: 2018-01-08 | End: 2018-01-08

## 2018-01-08 RX ORDER — AZITHROMYCIN 250 MG/1
TABLET, FILM COATED ORAL
Qty: 6 TAB | Refills: 0 | Status: SHIPPED | OUTPATIENT
Start: 2018-01-08 | End: 2018-01-13

## 2018-01-08 RX ORDER — DEXAMETHASONE SODIUM PHOSPHATE 4 MG/ML
4 INJECTION, SOLUTION INTRA-ARTICULAR; INTRALESIONAL; INTRAMUSCULAR; INTRAVENOUS; SOFT TISSUE EVERY 12 HOURS
Status: DISCONTINUED | OUTPATIENT
Start: 2018-01-08 | End: 2018-01-08 | Stop reason: HOSPADM

## 2018-01-08 RX ADMIN — DEXAMETHASONE SODIUM PHOSPHATE 4 MG: 4 INJECTION, SOLUTION INTRAMUSCULAR; INTRAVENOUS at 02:06

## 2018-01-08 RX ADMIN — POTASSIUM & SODIUM PHOSPHATES POWDER PACK 280-160-250 MG 2 PACKET: 280-160-250 PACK at 08:36

## 2018-01-08 RX ADMIN — VANCOMYCIN HYDROCHLORIDE 1250 MG: 10 INJECTION, POWDER, LYOPHILIZED, FOR SOLUTION INTRAVENOUS at 00:19

## 2018-01-08 RX ADMIN — DEXAMETHASONE SODIUM PHOSPHATE 4 MG: 4 INJECTION, SOLUTION INTRAMUSCULAR; INTRAVENOUS at 09:58

## 2018-01-08 RX ADMIN — Medication 1 CAPSULE: at 08:23

## 2018-01-08 RX ADMIN — VANCOMYCIN HYDROCHLORIDE 1250 MG: 10 INJECTION, POWDER, LYOPHILIZED, FOR SOLUTION INTRAVENOUS at 08:24

## 2018-01-08 RX ADMIN — POTASSIUM & SODIUM PHOSPHATES POWDER PACK 280-160-250 MG 2 PACKET: 280-160-250 PACK at 12:46

## 2018-01-08 RX ADMIN — SODIUM CHLORIDE 3.38 G: 900 INJECTION, SOLUTION INTRAVENOUS at 05:51

## 2018-01-08 NOTE — PROGRESS NOTES
Vancomycin trough resulted at 9.1 mcg/ml. This extrapolates to 10.0 mcg/ml and an AUC:JAMARI of 379. This is below goal.  Will increase dose to 1250 mg IV q8H, with next dose at 0000 1/8/18. This predicts a trough of 12.5 mcg/ml and an AUC:JAMARI of 467.

## 2018-01-08 NOTE — PROGRESS NOTES
Bedside and Verbal shift change report given to Antonina Rivers RN (oncoming nurse) by Kaia Trevizo RN (offgoing nurse). Report included the following information SBAR, Kardex, ED Summary, Intake/Output, MAR, Accordion and Recent Results.

## 2018-01-08 NOTE — DISCHARGE INSTRUCTIONS
HOSPITALIST DISCHARGE INSTRUCTIONS  NAME: Danielle Luna   :  1985   MRN:  [id number]     Date/Time:  2018 11:51 AM    ADMIT DATE: 2018     DISCHARGE DATE: 2018     PRINCIPAL DISCHARGE DIAGNOSES:  Facial cellulitis    MEDICATIONS:  · It is important that you take the medication exactly as they are prescribed. Note the changes and additions to your medications. Be sure you understand these changes before you are discharged today. · Keep your medication in the bottles provided by the pharmacist and keep a list of the medication names, dosages, and times to be taken in your wallet. · Do not take other medications without consulting your doctor. Pain Management: per above medications    What to do at Home    Recommended diet:  Resume previous diet    Recommended activity: Activity as tolerated    If you experience any of the following symptoms then please call your primary care physician or return to the emergency room if you cannot get hold of your doctor:  Fever, chills, severe abdominal pain, nausea, vomiting, diarrhea, worsening facial redness, swelling, pain or other severe concerning symptoms that brought you to the hospital in the first place    Follow Up: Follow-up Information     Follow up With Details Comments Contact Info    Sharon Vargas MD In 5 days  [address] [address] [phone]      Stefania Simpson MD  As needed [address] [address] and [address] [place] [address]  [phone]      Follow up with your dentist               Information obtained by :  I understand that if any problems occur once I am at home I am to contact my physician. I understand and acknowledge receipt of the instructions indicated above.                                                                                                                                            Physician's or R.N.'s Signature Date/Time                                                                                                                                              Patient or Representative Signature                                                          Date/Time

## 2018-01-08 NOTE — DISCHARGE SUMMARY
Physician Discharge Summary     Patient ID:  Juliette Cole  [id number]  30 y.o.  1985    Admit date: 1/6/2018    Discharge date: 1/8/2018    Admission Diagnoses: Cellulitis    Principal Discharge Diagnoses:    Facial cellulitis    OTHER PROBLEMS ADDRESSEDS  Principal Diagnosis Cellulitis                                            Principal Problem:    Cellulitis (1/6/2018)    Active Problems:    Leukocytosis (1/7/2018)      SIRS (systemic inflammatory response syndrome) ([facility] (1/7/2018)       Patient Active Problem List   Diagnosis Code    Cellulitis L03.90    Leukocytosis D72.829    SIRS (systemic inflammatory response syndrome) ([facility] 75[facility] R65.10         Hospital Course:         Cellulitis: presumably due to dental infection. Pt and wife (nurse) mention cat scratch as well. I do note a small superficial abrasion on his chin, so it's possible he may have cat scratch disease as well. He is doing much better. Swelling is down. Leukocytosis persists due to steroids which he will not need at discharge. He will continue taking his Augmentin and I will add azithromycin to cover for cat scratch disease. CT neck showed no evidence of abscess. Appreciate ENT evaluation. Pt will follow up with PCP, dentist, and ENT as needed. Start pro-biotic to mitigate risk of C. diff       Leukocytosis (1/7/2018)/SIRS (systemic inflammatory response syndrome) ([facility] 75.): as above. No fevers     Pt discharged in improved and stable condition. Procedures performed: none    Imaging studies: CT neck  The nasopharynx, oropharynx, hypopharynx and larynx are normal.  There is  limitation in evaluating the oral cavity, although grossly the oral tongue,  buccal spaces and floor of mouth are normal.   The thyroid, submandibular, and  parotid glands are normal.   There are scattered nonenlarged lymph nodes in the  cervical soft tissues.     IMPRESSION  IMPRESSION:  1. Thickening of the skin of the soft tissues of the face.  No drainable abscess            PCP: None    Consults: ENT    Discharge Exam:  Patient Vitals for the past 12 hrs:   Temp Pulse Resp BP SpO2   01/08/18 1114 98.3 °F (36.8 °C) 77 16 118/64 96 %   01/08/18 0858 98.2 °F (36.8 °C) 78 16 116/66 95 %   01/08/18 0417 97.8 °F (36.6 °C) 68 16 106/65 95 %   01/08/18 0018 98.5 °F (36.9 °C) 71 16 108/66 95 %     GEN: NAD  HEENT: mild lower facial swelling with redness, mild TTP. No induration, fluctuance. No oropharyngeal exudates. Dental caries. Superficial abrasion with scab on chin  CV: RRR. No MRG  RESP: CTAB      Disposition: home    Patient Instructions:   Current Discharge Medication List      START taking these medications    Details   azithromycin (ZITHROMAX) 250 mg tablet Take 500mg daily on day 1, followed by 250mg daily on days 2-5  #6 tabs  Qty: 6 Tab, Refills: 0      lactobacillus, B.ani-L.aci-L.sal-L.plan-L. George, (PROBIOTIC FORMULA) 10 billion cell (2 billion ea) cap capsule Take 1 Cap by mouth daily. Qty: 14 Cap, Refills: 0         CONTINUE these medications which have NOT CHANGED    Details   amoxicillin-clavulanate (AUGMENTIN) 875-125 mg per tablet Take 1 Tab by mouth every twelve (12) hours. HYDROcodone-acetaminophen (NORCO) 7.5-325 mg per tablet Take 1 Tab by mouth every six (6) hours as needed for Pain. Activity: See discharge instructions  Diet: See discharge instructions  Wound Care: See discharge instructions    Follow-up Information     Follow up With Details Comments Contact Info    Daniel Last MD In 5 days  [address] [address] [phone]      Eileen Ann MD  As needed [id number] [address] and [address] [place] [address]  [phone]      Follow up with your dentist             I spent 40 minutes on this discharge.     Signed:  Jarrell Nagel MD  1/8/2018  11:51 AM

## 2018-01-08 NOTE — PROGRESS NOTES
1-8-2018 CASE MANAGEMENT NOTE:  I met with the pt to determine potential discharge needs. He lives with his wife, Saad Ellison ([phone]), in a tri-level house. He is independent with his ADL's, works full-time and drives. He does not have a PCP currently but plans to start seeing Dr. Clint Shroe as his family uses him. He has not health care insurance as he just decided to pass it up from work this year as he \"never gets sick\". He does have the application to apply for the Care Card and will complete it. He uses [facility] at [facility] for prescription drugs. He is not anticipating any discharge needs. Care Management Interventions  PCP Verified by CM:  Yes (None-plans to start seeing Dr. Clint Shore)  Discharge Durable Medical Equipment: No  Physical Therapy Consult: No  Occupational Therapy Consult: No  Speech Therapy Consult: No  Current Support Network: Lives with Spouse, Own Home  Confirm Follow Up Transport: Family  Discharge Location  Discharge Placement:  (Home)    HERBERTH Beckett, CM

## 2018-01-08 NOTE — PROGRESS NOTES
Bedside shift change report given to Lorin (oncoming nurse) by Miley Nieves (offgoing nurse). Report included the following information SBAR, Kardex, Procedure Summary, MAR and Recent Results.

## 2018-01-08 NOTE — PROGRESS NOTES
Handed patient 2 scripts and a copy of discharge instructions which have been read and explained to him and his female .  Verbalized understanding

## 2018-01-09 NOTE — PROGRESS NOTES
Spiritual Care Partner Volunteer visited patient in med surg on 1/8/2018. This is a late chart note. Documented by:  Visit by: Rev. Celina Olivo.  Ivonne Hubbard MA, [facility]    Lead  Profession Development & Advancement

## 2018-01-11 LAB
BACTERIA SPEC CULT: NORMAL
SERVICE CMNT-IMP: NORMAL